# Patient Record
Sex: FEMALE | Race: WHITE
[De-identification: names, ages, dates, MRNs, and addresses within clinical notes are randomized per-mention and may not be internally consistent; named-entity substitution may affect disease eponyms.]

---

## 2017-03-16 ENCOUNTER — HOSPITAL ENCOUNTER (EMERGENCY)
Dept: HOSPITAL 25 - UCCORT | Age: 52
Discharge: HOME | End: 2017-03-16
Payer: COMMERCIAL

## 2017-03-16 VITALS — SYSTOLIC BLOOD PRESSURE: 140 MMHG | DIASTOLIC BLOOD PRESSURE: 76 MMHG

## 2017-03-16 DIAGNOSIS — R03.0: ICD-10-CM

## 2017-03-16 DIAGNOSIS — J32.9: Primary | ICD-10-CM

## 2017-03-16 PROCEDURE — G0463 HOSPITAL OUTPT CLINIC VISIT: HCPCS

## 2017-03-16 PROCEDURE — 87651 STREP A DNA AMP PROBE: CPT

## 2017-03-16 PROCEDURE — 99212 OFFICE O/P EST SF 10 MIN: CPT

## 2017-03-16 NOTE — UC
Throat Pain/Nasal Tai HPI





- HPI Summary


HPI Summary: 





pt c/o nasal congestion, sore throat, generalized malaise, PND and sinus 

pressure and pain X 7-10 days. 





- History of Current Complaint


Chief Complaint: UCRespiratory


Stated Complaint: SORE THROAT


Time Seen by Provider: 03/16/17 14:31


Hx Obtained From: Patient


Hx Last Menstrual Period: 1 yr


Pregnant?: No


Onset/Duration: Gradual Onset, Lasting Days - 7-10


Severity: Mild


Cough: None


Associated Signs & Symptoms: Positive: Dysphagia, Sinus Discomfort - left 

maxillary


Related History: Seasonal Allergies





- Allergies/Home Medications


Allergies/Adverse Reactions: 


 Allergies











Allergy/AdvReac Type Severity Reaction Status Date / Time


 


seasonal Allergy  Congestion Uncoded 03/16/17 14:29











Home Medications: 


 Home Medications





Pseudoephedrine-Guaifenesin [Mucinex D  mg] 1 tab PO BID 03/16/17 [

History Confirmed 03/16/17]











PMH/Surg Hx/FS Hx/Imm Hx


Previously Healthy: Yes





- Surgical History


Surgical History: Yes


Surgery Procedure, Year, and Place: c section x2





- Family History


Known Family History: Positive: Cardiac Disease





- Social History


Occupation: Employed Full-time


Lives: With Family


Alcohol Use: Occasionally


Substance Use Type: None


Smoking Status (MU): Never Smoked Tobacco





- Immunization History


Most Recent Influenza Vaccination: no





Review of Systems


Constitutional: Chills, Fatigue


Skin: Negative


Eyes: Negative


ENT: Sore Throat, Other - nasal congestion, left maxillary sinus tenderness


Respiratory: Negative


Cardiovascular: Negative


Gastrointestinal: Negative


Genitourinary: Negative


Motor: Negative


Neurovascular: Negative


Musculoskeletal: Myalgia


Neurological: Headache


Psychological: Negative


All Other Systems Reviewed And Are Negative: Yes





Physical Exam


Triage Information Reviewed: Yes


Appearance: Ill-Appearing


Vital Signs: 


 Initial Vital Signs











Temp  98.9 F   03/16/17 14:23


 


Pulse  105   03/16/17 14:23


 


Resp  20   03/16/17 14:23


 


BP  140/76   03/16/17 14:23











Vital Signs Reviewed: No


ENT Exam: Other


ENT: Positive: Nasal congestion, Other: - left maxillary sinus tenderness


Neck exam: Normal


Respiratory Exam: Normal


Cardiovascular Exam: Normal


Musculoskeletal Exam: Normal


Neurological Exam: Normal


Psychological Exam: Normal


Skin Exam: Normal





Throat Pain/Nasal Course/Dx





- Differential Dx/Diagnosis


Differential Diagnosis/HQI/PQRI: Influenza, Pharyngitis, Sinusitis, URI


Provider Diagnoses: Sinusitis





Discharge





- Discharge Plan


Condition: Stable


Disposition: HOME


Prescriptions: 


Amoxicillin (*) [Amoxicillin 875 MG (*)] 875 mg PO BID #20 tab


Fluconazole 100 MG TAB* [Diflucan 100 MG TAB*] 100 mg PO DAILY #2 tab


Patient Education Materials:  Sinusitis (ED)


Referrals: 


Arelis Batres MD [Medical Doctor] - 


Additional Instructions: 


Please follow up with your PCP or return to clinic as needed.

## 2018-01-21 ENCOUNTER — HOSPITAL ENCOUNTER (EMERGENCY)
Dept: HOSPITAL 25 - UCCORT | Age: 53
Discharge: HOME | End: 2018-01-21
Payer: COMMERCIAL

## 2018-01-21 VITALS — DIASTOLIC BLOOD PRESSURE: 87 MMHG | SYSTOLIC BLOOD PRESSURE: 122 MMHG

## 2018-01-21 DIAGNOSIS — Z72.89: ICD-10-CM

## 2018-01-21 DIAGNOSIS — J32.9: Primary | ICD-10-CM

## 2018-01-21 PROCEDURE — 99212 OFFICE O/P EST SF 10 MIN: CPT

## 2018-01-21 PROCEDURE — G0463 HOSPITAL OUTPT CLINIC VISIT: HCPCS

## 2018-01-21 NOTE — UC
Throat Pain/Nasal Tai HPI





- HPI Summary


HPI Summary: 





sinus pressure, h/a, sinus congestion for 2 weeks now. taking zyrtec for 

allergies without relief. taking iburpfen prn for h/a with some relief - 

congestion mostly left side of face





- History of Current Complaint


Chief Complaint: UCGeneralIllness


Stated Complaint: SINUSES


Time Seen by Provider: 01/21/18 09:26


Hx Obtained From: Patient


Hx Last Menstrual Period: 01/01/18


Onset/Duration: Lasting Weeks


Severity: Moderate


Cough: Nonproductive


Associated Signs & Symptoms: Positive: Sinus Discomfort, Nasal Discharge





- Epiglottits Risk Factors


Epiglottis Risk Factors: Negative





- Allergies/Home Medications


Allergies/Adverse Reactions: 


 Allergies











Allergy/AdvReac Type Severity Reaction Status Date / Time


 


seasonal Allergy  Congestion Uncoded 01/21/18 09:04














PMH/Surg Hx/FS Hx/Imm Hx


Previously Healthy: Yes


GI/ History: Gastroesophageal Reflux





- Surgical History


Surgical History: Yes


Surgery Procedure, Year, and Place: c section x2





- Family History


Known Family History: Positive: Cardiac Disease





- Social History


Alcohol Use: Occasionally


Substance Use Type: None


Smoking Status (MU): Never Smoked Tobacco





- Immunization History


Most Recent Influenza Vaccination: no





Review of Systems


Constitutional: Negative


Skin: Negative


Eyes: Negative


ENT: Sore Throat, Ear Ache, Nasal Discharge, Sinus Congestion


Respiratory: Cough - nonproductive


Cardiovascular: Negative


Gastrointestinal: Negative


Genitourinary: Negative


Motor: Negative


Neurovascular: Negative


Musculoskeletal: Negative


Neurological: Headache


Psychological: Negative


Is Patient Immunocompromised?: No


All Other Systems Reviewed And Are Negative: Yes





Physical Exam


Triage Information Reviewed: Yes


Appearance: Ill-Appearing


Vital Signs: 


 Initial Vital Signs











Temp  98.4 F   01/21/18 09:00


 


Pulse  101   01/21/18 09:00


 


Resp  17   01/21/18 09:00


 


BP  122/87   01/21/18 09:00


 


Pulse Ox  100   01/21/18 09:00











Vital Signs Reviewed: Yes


Eye Exam: Normal


ENT: Positive: Pharyngeal erythema, TM bulging, Hoarse voice, Sinus tenderness


Neck exam: Normal


Respiratory Exam: Normal


Cardiovascular Exam: Normal


Neurological Exam: Normal


Psychological Exam: Normal


Skin Exam: Normal





Throat Pain/Nasal Course/Dx





- Course


Course Of Treatment: take abx with food to reduce GI upset.  take full course 

of abx - discussed use and common side effects of med.  increase fluid intake 

daily while on abx to prevent dehydration.  take tylenol or ibuprofen every 4-6 

hours prn for pain/fever.  f/u PCP 1 week if symptoms not resolving or getting 

worse





- Differential Dx/Diagnosis


Provider Diagnoses: sinusitis





Discharge





- Discharge Plan


Condition: Good


Disposition: HOME


Prescriptions: 


Azithromycin TAB* [Zithromax TAB (Z-ALIZA) 250 mg #6 tabs] 2 tab PO .TODAY, THEN 

1 DAILY #1 aliza


Fluconazole 150 MG (NF) [Diflucan 150 mg (NF)] 150 mg PO ONCE 2 Days #2 tab


Patient Education Materials:  Sinusitis (ED)


Referrals: 


Misty Torres MD [Primary Care Provider] - 1 Week

## 2019-09-03 ENCOUNTER — HOSPITAL ENCOUNTER (EMERGENCY)
Dept: HOSPITAL 25 - UCCORT | Age: 54
Discharge: HOME | End: 2019-09-03
Payer: COMMERCIAL

## 2019-09-03 VITALS — DIASTOLIC BLOOD PRESSURE: 77 MMHG | SYSTOLIC BLOOD PRESSURE: 139 MMHG

## 2019-09-03 DIAGNOSIS — N39.0: Primary | ICD-10-CM

## 2019-09-03 PROCEDURE — 99212 OFFICE O/P EST SF 10 MIN: CPT

## 2019-09-03 PROCEDURE — G0463 HOSPITAL OUTPT CLINIC VISIT: HCPCS

## 2019-09-03 PROCEDURE — 81003 URINALYSIS AUTO W/O SCOPE: CPT

## 2019-09-03 PROCEDURE — 87086 URINE CULTURE/COLONY COUNT: CPT

## 2019-09-03 PROCEDURE — 87077 CULTURE AEROBIC IDENTIFY: CPT

## 2019-09-03 NOTE — UC
Complaint Female HPI





- HPI Summary


HPI Summary: 





dysuria x 1 days


pain is 4 out of 10 ,


worse with urination , better with fluid


no fever, no chills, no flank pain 





- History Of Current Complaint


Chief Complaint: UCGU


Stated Complaint: URINARY


Time Seen by Provider: 09/03/19 07:23


Hx Obtained From: Patient


Hx Last Menstrual Period: 9/2/19


Onset/Duration: Gradual Onset, Lasting Days - 1, Still Present


Timing: Constant


Severity Initially: Moderate


Severity Currently: Moderate


Pain Intensity: 0


Pain Scale Used: 0-10 Numeric


Character: Burning


Aggravating Factor(s): Urination


Alleviating Factor(s): Other - fluid,


Associated Signs And Symptoms: Positive: Back Pain.  Negative: Fever, Vaginal 

Bleeding/Discharge, Vaginal Discharge, Nausea, Vomiting(# Of Episodes =), 

Genital Swelling, Genital Blisters, Retained Foregin Body (Specify)





- Allergies/Home Medications


Allergies/Adverse Reactions: 


 Allergies











Allergy/AdvReac Type Severity Reaction Status Date / Time


 


seasonal Allergy  Congestion Uncoded 09/03/19 07:31














PMH/Surg Hx/FS Hx/Imm Hx


GI/ History: Gastroesophageal Reflux





- Surgical History


Surgical History: Yes


Surgery Procedure, Year, and Place: c section x2





- Family History


Known Family History: Positive: Cardiac Disease





- Social History


Alcohol Use: Occasionally


Substance Use Type: None


Smoking Status (MU): Never Smoked Tobacco





- Immunization History


Most Recent Influenza Vaccination: no





Review of Systems


All Other Systems Reviewed And Are Negative: Yes


Constitutional: Positive: Negative


Skin: Positive: Negative


Eyes: Positive: Negative


ENT: Positive: Negative


Respiratory: Positive: Negative


Gastrointestinal: Positive: Abdominal Pain


Genitourinary: Positive: Dysuria


Is Patient Immunocompromised?: No





Physical Exam


Triage Information Reviewed: Yes


Appearance: Well-Appearing, No Pain Distress, Well-Nourished


Vital Signs: 


 Initial Vital Signs











Temp  98.6 F   09/03/19 07:25


 


Pulse  93   09/03/19 07:25


 


Resp  16   09/03/19 07:25


 


BP  139/77   09/03/19 07:25


 


Pulse Ox  99   09/03/19 07:25











Vital Signs Reviewed: Yes


Eye Exam: Normal


Eyes: Positive: Conjunctiva Clear


ENT: Positive: Normal ENT inspection, Hearing grossly normal


Neck: Positive: Supple, Nontender, No Lymphadenopathy


Respiratory: Positive: Chest non-tender, Lungs clear, Normal breath sounds


Cardiovascular: Positive: RRR, No Murmur, Pulses Normal


Abdomen Description: Positive: Nontender, Soft.  Negative: CVA Tenderness (R), 

CVA Tenderness (L), Distended, Guarding


Bowel Sounds: Positive: Present





 Complaint Female Dx





- Differential Dx/Diagnosis


Provider Diagnosis: 


 UTI (urinary tract infection)








Discharge ED





- Sign-Out/Discharge


Documenting (check all that apply): Patient Departure


All imaging exams completed and their final reports reviewed: No Studies





- Discharge Plan


Condition: Stable


Disposition: HOME


Prescriptions: 


Fluconazole 150 MG TAB* [Diflucan 150 MG TAB*] 150 mg PO ED ONCE #1 tablet


Sulfamethox/Trimethoprim DS* [Bactrim /160 TAB*] 1 tab PO BID #14 tab


Patient Education Materials:  Urinary Tract Infection in Women (DC)


Referrals: 


Annelise Nguyen PA [Primary Care Provider] - If Needed





- Billing Disposition and Condition


Condition: STABLE


Disposition: Home